# Patient Record
Sex: MALE | Race: WHITE | Employment: OTHER | URBAN - NONMETROPOLITAN AREA
[De-identification: names, ages, dates, MRNs, and addresses within clinical notes are randomized per-mention and may not be internally consistent; named-entity substitution may affect disease eponyms.]

---

## 2022-09-06 ENCOUNTER — INITIAL CONSULT (OUTPATIENT)
Dept: NEUROLOGY | Age: 72
End: 2022-09-06
Payer: MEDICARE

## 2022-09-06 VITALS
OXYGEN SATURATION: 98 % | WEIGHT: 191 LBS | HEART RATE: 68 BPM | DIASTOLIC BLOOD PRESSURE: 88 MMHG | SYSTOLIC BLOOD PRESSURE: 128 MMHG

## 2022-09-06 DIAGNOSIS — R29.898 BILATERAL LEG WEAKNESS: Primary | ICD-10-CM

## 2022-09-06 DIAGNOSIS — M21.371 RIGHT FOOT DROP: ICD-10-CM

## 2022-09-06 DIAGNOSIS — R26.89 BALANCE PROBLEM: ICD-10-CM

## 2022-09-06 PROCEDURE — 99205 OFFICE O/P NEW HI 60 MIN: CPT | Performed by: PSYCHIATRY & NEUROLOGY

## 2022-09-06 PROCEDURE — 1123F ACP DISCUSS/DSCN MKR DOCD: CPT | Performed by: PSYCHIATRY & NEUROLOGY

## 2022-09-06 RX ORDER — ASPIRIN 81 MG/1
81 TABLET ORAL DAILY
COMMUNITY

## 2022-09-06 RX ORDER — TESTOSTERONE CYPIONATE 200 MG/ML
75 INJECTION INTRAMUSCULAR WEEKLY
COMMUNITY

## 2022-09-06 RX ORDER — DILTIAZEM HYDROCHLORIDE 120 MG/1
TABLET, FILM COATED ORAL
COMMUNITY

## 2022-09-06 RX ORDER — KRILL/OM-3/DHA/EPA/PHOSPHO/AST 500-110 MG
CAPSULE ORAL
COMMUNITY
Start: 2020-07-20

## 2022-09-06 RX ORDER — DILTIAZEM HYDROCHLORIDE 120 MG/1
120 CAPSULE, EXTENDED RELEASE ORAL DAILY
COMMUNITY

## 2022-09-06 RX ORDER — TADALAFIL 5 MG/1
TABLET ORAL
COMMUNITY

## 2022-09-06 RX ORDER — SYRINGE WITH NEEDLE, 1 ML 25GX5/8"
SYRINGE, EMPTY DISPOSABLE MISCELLANEOUS
COMMUNITY
Start: 2022-08-28

## 2022-09-06 RX ORDER — MONTELUKAST SODIUM 10 MG/1
10 TABLET ORAL NIGHTLY
COMMUNITY

## 2022-09-06 RX ORDER — FLUTICASONE PROPIONATE 50 MCG
2 SPRAY, SUSPENSION (ML) NASAL PRN
COMMUNITY

## 2022-09-06 RX ORDER — ATORVASTATIN CALCIUM 40 MG/1
40 TABLET, FILM COATED ORAL DAILY
COMMUNITY

## 2022-09-06 RX ORDER — LOSARTAN POTASSIUM 25 MG/1
TABLET ORAL
COMMUNITY
Start: 2022-07-29

## 2022-09-06 RX ORDER — PANTOPRAZOLE SODIUM 40 MG/1
40 TABLET, DELAYED RELEASE ORAL DAILY
COMMUNITY

## 2022-09-06 RX ORDER — FLUDROCORTISONE ACETATE 0.1 MG/1
TABLET ORAL
COMMUNITY

## 2022-09-06 RX ORDER — POTASSIUM CHLORIDE 750 MG/1
10 TABLET, FILM COATED, EXTENDED RELEASE ORAL 2 TIMES DAILY
COMMUNITY

## 2022-09-06 RX ORDER — TAMSULOSIN HYDROCHLORIDE 0.4 MG/1
CAPSULE ORAL
COMMUNITY
Start: 2022-07-26

## 2022-09-06 RX ORDER — RANOLAZINE 500 MG/1
TABLET, EXTENDED RELEASE ORAL
COMMUNITY
Start: 2022-07-01

## 2022-09-06 RX ORDER — AMOXICILLIN 250 MG
CAPSULE ORAL EVERY MORNING
COMMUNITY

## 2022-09-06 NOTE — PATIENT INSTRUCTIONS
MRI lumbar spine WO contrast   MRI thoracic spine W/WO contrast  Vitamin B12, folate  Vitamin B6 level  Call with any new symptoms or concerns. Follow up in 1 month.

## 2022-09-22 DIAGNOSIS — R29.898 BILATERAL LEG WEAKNESS: Primary | ICD-10-CM

## 2022-09-22 DIAGNOSIS — M21.371 RIGHT FOOT DROP: ICD-10-CM

## 2022-09-22 DIAGNOSIS — R26.89 BALANCE PROBLEM: ICD-10-CM

## 2022-09-22 RX ORDER — LORAZEPAM 0.5 MG/1
TABLET ORAL
Qty: 1 TABLET | Refills: 0 | Status: SHIPPED | OUTPATIENT
Start: 2022-09-22 | End: 2022-09-28

## 2022-09-22 NOTE — TELEPHONE ENCOUNTER
Patient requesting something to help him remain calm for MRI on 9/28/2022. Please advise. Thank you.

## 2022-09-22 NOTE — TELEPHONE ENCOUNTER
Patient can take Ativan 0.5 mg one hour prior to MRI. 0 refills. Please ensure he has a  for the MRI.   Jorden Hopkins, CNP

## 2022-09-26 NOTE — PROGRESS NOTES
Chief Complaint   Patient presents with    Consultation     NP parethesia, trigger finger, left ring finger         Sonya Salmon is a 67 y.o. male who presents today for evaluation of right hand numbness, left hand trigger finger, however he reports these have improved since his referral was made and he is wishing to discuss his bilateral leg weakness. He has had bilateral leg weakness following his first back surgery in 2014. He then had a second back surgery in 2019. He reports climbing stairs is a struggle and the longer he walks the weaker his legs become. He reports he has to lay down to sleep and rest everyday around noon and he feels better and his energy level and weakness improves following his nap and he can continue throughout the day. He denies any slurred speech, trouble swallowing, shortness of breath, or double vision. He reports his balance is worse. No recent falls. He can have occasional bowel incontinence. He denies chest pain. No shortness of breath. No vision changes. No dysphagia. No fever. No rash. No weight loss. History provided by patient. Past Medical History:   Diagnosis Date    Hypertension        There is no problem list on file for this patient. Allergies   Allergen Reactions    Morphine Anaphylaxis     When taken with lopressor - caused hypotension and decreased sensation    Metoprolol Other (See Comments)     Lopressor -   When taken with morphine- caused hypotension and decreased sensation       Current Outpatient Medications   Medication Sig Dispense Refill    aspirin 81 MG EC tablet Take 81 mg by mouth daily      atorvastatin (LIPITOR) 40 MG tablet Take 40 mg by mouth daily      vitamin D (CHOLECALCIFEROL) 125 MCG (5000 UT) CAPS capsule Take 5,000 Units by mouth daily      Cobalamin Combinations (B-12) 100-5000 MCG SUBL every morning      dilTIAZem (CARDIZEM) 120 MG tablet diltiazem 120 mg tablet   Take 1 tablet every day by oral route.       dilTIAZem (TIAZAC) 120 MG extended release capsule Take 120 mg by mouth daily      fludrocortisone (FLORINEF) 0.1 MG tablet fludrocortisone 0.1 mg tablet   Take 1 tablet every day by oral route. fluticasone (FLONASE) 50 MCG/ACT nasal spray 2 sprays by Nasal route as needed      Krill Oil (OMEGA-3) 500 MG CAPS Krill-Om-3-Dha-Epa-Phospho-Ast Active 1 CAP PO Daily July 20th, 2020 10:57am      losartan (COZAAR) 25 MG tablet TAKE 1 TABLET BY MOUTH EVERY DAY      montelukast (SINGULAIR) 10 MG tablet Take 10 mg by mouth nightly      pantoprazole (PROTONIX) 40 MG tablet Take 40 mg by mouth daily      potassium chloride (KLOR-CON) 10 MEQ extended release tablet Take 10 mEq by mouth 2 times daily      ranolazine (RANEXA) 500 MG extended release tablet TAKE 1 TABLET BY MOUTH TWICE A DAY      B-D 3CC LUER-SAUL SYR 62YV6-4/2 21G X 1-1/2\" 3 ML MISC TO BE USED WITH DEPO TESTOSTERONE INJECTIONS WEEKLY      tadalafil (CIALIS) 5 MG tablet Cialis 5 mg tablet   Take 1 tablet every day by oral route. tamsulosin (FLOMAX) 0.4 MG capsule tamsulosin 0.4 mg capsule   Take 1 capsule every day by oral route. testosterone cypionate (DEPOTESTOTERONE CYPIONATE) 200 MG/ML injection Inject 75 mg into the muscle once a week. traMADol-acetaminophen (ULTRACET) 37.5-325 MG per tablet tramadol 37.5 mg-acetaminophen 325 mg tablet   Take 2 tablets every 4 hours by oral route. No current facility-administered medications for this visit. Social History     Socioeconomic History    Marital status:      Spouse name: None    Number of children: None    Years of education: None    Highest education level: None   Tobacco Use    Smoking status: Former     Types: Cigarettes    Smokeless tobacco: Never   Vaping Use    Vaping Use: Never used   Substance and Sexual Activity    Alcohol use:  Yes     Alcohol/week: 1.0 standard drink     Types: 1 Glasses of wine per week     Comment: socially    Drug use: Not Currently    Sexual activity: Yes     Partners: Female       Family History   Problem Relation Age of Onset    Hypertension Mother     Kidney Disease Mother     No Known Problems Father          I reviewed the past medical history, allergies, medications, social history and family history. Review of Systems   All systems reviewed, and are all negative, except what is mentioned in HPI      Vitals:    09/06/22 1457   BP: 128/88   Site: Left Upper Arm   Position: Sitting   Cuff Size: Medium Adult   Pulse: 68   SpO2: 98%   Weight: 191 lb (86.6 kg)       Physical Examination:  General appearance - alert, well appearing, and in no distress, oriented to person, place, and time and over weight  Mental status- Level of Alertness: awake  Orientation: person, place, time  Memory: normal  Fund of Knowledge: normal  Attention/Concentration: normal  Language: normal. Mood is normal.   Neck - supple, no significant adenopathy, carotids upstroke normal bilaterally. There is no axillary lymphadenopathy. There is no carotid bruit. No neck lymphadenopathy. No thyroid enlargement   Neurological -   Cranial Mekdxr-TD-MLD:.   Cranial nerve II: Normal   Cranial nerve III: Pupils: equal, round, reactive to light  Cranial nerves III, IV, VI: Extraocular Movements: intact   Cranial nerve V: Facial sensation: intact   Cranial nerve VII:Facial strength: intact   Cranial nerve VIII: Hearing: intact   Cranial nerve IX: Palate Elevation intact bilaterally  Cranial nerve XI: Shoulder shrug intact bilaterally  Cranial nerve XII: Tongue midline   neck supple without rigidity, there is  limitation of range of motion of the neck, bilaterally  DTR's are decreased distal and symmetric  Babinski sign negative  Motor exam is 5/5 in the bilateral upper extremities and 4/5 in bilateral lower extremities worse on the right than the left. There is right foot drop noted. . Normal muscle tone.  There is  muscle atrophy of left thigh  Sensory is intact for light touch  Coordination: finger to nose, intact  Gait and station intact  Abnormal movement none, vibration normal, proprioception normal  Skin - warm, dry to touch, normal coloration, no rashes, no suspicious skin lesions  Superficial temporal artery pulses are normal.   There is no limitation of range of motion of the neck. There is no resting tremor, no pin rolling, no bradykinesia, no Hypohonia, normal blink rate. Musculoskeletal: Has no hand arthritis, no limitation of ROM in any of the four extremities. There is no leg edema. The Heart was regular in rate and rhythm. No heart murmur  Chest Clear, with  good effort. Abdomen soft, intact bowel sounds. We reviewed the patient records from referring provider and available information in the EHR       ASSESSMENT:      Diagnosis Orders   1. Bilateral leg weakness        2. Right foot drop           This is a 65-year-old male who was initially referred for right hand numbness, left hand trigger finger, however he reports these have improved since his referral was made and he is wishing to discuss his bilateral leg weakness. He has had bilateral leg weakness following his first back surgery in 2014. He then had a second back surgery in 2019. He reports climbing stairs is a struggle and the longer he walks the weaker his legs become. He reports he has to lay down to sleep and rest everyday around noon and he feels better and his energy level and weakness improves following his nap and he can continue throughout the day. He also reports saddle numbness, and endorses occasional bowel incontinence. On exam, he has bilateral leg weakness as well as right foot drop. The patient was counseled about his symptoms and work up recommended, he was also counseled about DDx, and need to screen for lower spine pathology contributing to his symptoms.  We will arrange for him to undergo an MRI of the lumbar spine without contrast to screen for lumbar spine stenosis as well as MRI thoracic spine with and

## 2022-09-28 ENCOUNTER — HOSPITAL ENCOUNTER (OUTPATIENT)
Dept: MRI IMAGING | Age: 72
Discharge: HOME | End: 2022-09-28
Payer: MEDICARE

## 2022-09-28 DIAGNOSIS — R29.898 BILATERAL LEG WEAKNESS: ICD-10-CM

## 2022-09-28 DIAGNOSIS — M21.371 RIGHT FOOT DROP: ICD-10-CM

## 2022-09-28 PROCEDURE — 72157 MRI CHEST SPINE W/O & W/DYE: CPT

## 2022-09-28 PROCEDURE — 72148 MRI LUMBAR SPINE W/O DYE: CPT

## 2022-09-28 PROCEDURE — A9579 GAD-BASE MR CONTRAST NOS,1ML: HCPCS | Performed by: NURSE PRACTITIONER

## 2022-09-28 PROCEDURE — 6360000004 HC RX CONTRAST MEDICATION: Performed by: NURSE PRACTITIONER

## 2022-09-28 RX ADMIN — GADOTERIDOL 15 ML: 279.3 INJECTION, SOLUTION INTRAVENOUS at 18:33

## 2022-10-09 LAB — ACHR BINDING AB: 0 NMOL/L (ref 0–0.4)

## 2022-10-11 LAB — ACHR BLOCKING ABS, SERUM: 0 % (ref 0–26)

## 2022-10-13 LAB — ACETYLCHOL MODUL AB: 0 %

## 2022-10-31 ENCOUNTER — OFFICE VISIT (OUTPATIENT)
Dept: NEUROLOGY | Age: 72
End: 2022-10-31
Payer: MEDICARE

## 2022-10-31 VITALS
SYSTOLIC BLOOD PRESSURE: 130 MMHG | HEART RATE: 67 BPM | WEIGHT: 193 LBS | HEIGHT: 70 IN | OXYGEN SATURATION: 95 % | DIASTOLIC BLOOD PRESSURE: 78 MMHG | BODY MASS INDEX: 27.63 KG/M2

## 2022-10-31 DIAGNOSIS — R29.898 BILATERAL LEG WEAKNESS: Primary | ICD-10-CM

## 2022-10-31 DIAGNOSIS — R26.89 BALANCE PROBLEM: ICD-10-CM

## 2022-10-31 DIAGNOSIS — M21.371 RIGHT FOOT DROP: ICD-10-CM

## 2022-10-31 PROCEDURE — 99213 OFFICE O/P EST LOW 20 MIN: CPT | Performed by: NURSE PRACTITIONER

## 2022-10-31 PROCEDURE — 1123F ACP DISCUSS/DSCN MKR DOCD: CPT | Performed by: NURSE PRACTITIONER

## 2022-10-31 RX ORDER — ACETAMINOPHEN 325 MG/1
650 TABLET ORAL EVERY 4 HOURS PRN
COMMUNITY
Start: 2019-10-14

## 2022-10-31 NOTE — PATIENT INSTRUCTIONS
Referral to neurology at [facility] re: symptoms  Follow up as needed. Call if any questions or concerns.

## 2022-10-31 NOTE — PROGRESS NOTES
NEUROLOGY OUT PATIENT FOLLOW UP NOTE:  10/31/36159:19 AM    Jessika Distance is here for follow up for  bialteral leg weakness, right foot drop, balance trouble. ROS:  Respiratory : no cough, no shortness of breath  Cardiac: no chest pain. No palpitations.   Renal : no flank pain, no hematuria    Skin: no rash      Allergies   Allergen Reactions    Morphine Anaphylaxis     When taken with lopressor - caused hypotension and decreased sensation    Metoprolol Other (See Comments)     Lopressor -   When taken with morphine- caused hypotension and decreased sensation       Current Outpatient Medications:     acetaminophen (TYLENOL) 325 MG tablet, Take 650 mg by mouth every 4 hours as needed, Disp: , Rfl:     aspirin 81 MG EC tablet, Take 81 mg by mouth daily, Disp: , Rfl:     atorvastatin (LIPITOR) 40 MG tablet, Take 40 mg by mouth daily, Disp: , Rfl:     vitamin D (CHOLECALCIFEROL) 125 MCG (5000 UT) CAPS capsule, Take 5,000 Units by mouth daily, Disp: , Rfl:     Cobalamin Combinations (B-12) 100-5000 MCG SUBL, every morning, Disp: , Rfl:     dilTIAZem (CARDIZEM) 120 MG tablet, diltiazem 120 mg tablet  Take 1 tablet every day by oral route., Disp: , Rfl:     dilTIAZem (TIAZAC) 120 MG extended release capsule, Take 120 mg by mouth daily, Disp: , Rfl:     fludrocortisone (FLORINEF) 0.1 MG tablet, fludrocortisone 0.1 mg tablet  Take 1 tablet every day by oral route., Disp: , Rfl:     fluticasone (FLONASE) 50 MCG/ACT nasal spray, 2 sprays by Nasal route as needed, Disp: , Rfl:     Krill Oil (OMEGA-3) 500 MG CAPS, Krill-Om-3-Dha-Epa-Phospho-Ast Active 1 CAP PO Daily July 20th, 2020 10:57am, Disp: , Rfl:     losartan (COZAAR) 25 MG tablet, TAKE 1 TABLET BY MOUTH EVERY DAY, Disp: , Rfl:     montelukast (SINGULAIR) 10 MG tablet, Take 10 mg by mouth nightly, Disp: , Rfl:     pantoprazole (PROTONIX) 40 MG tablet, Take 40 mg by mouth daily, Disp: , Rfl:     potassium chloride (KLOR-CON) 10 MEQ extended release tablet, Take 10 mEq by mouth 2 times daily, Disp: , Rfl:     ranolazine (RANEXA) 500 MG extended release tablet, TAKE 1 TABLET BY MOUTH TWICE A DAY, Disp: , Rfl:     B-D 3CC LUER-SAUL SYR 25UO4-7/2 21G X 1-1/2\" 3 ML MISC, TO BE USED WITH DEPO TESTOSTERONE INJECTIONS WEEKLY, Disp: , Rfl:     tadalafil (CIALIS) 5 MG tablet, Cialis 5 mg tablet  Take 1 tablet every day by oral route., Disp: , Rfl:     tamsulosin (FLOMAX) 0.4 MG capsule, tamsulosin 0.4 mg capsule  Take 1 capsule every day by oral route., Disp: , Rfl:     testosterone cypionate (DEPOTESTOTERONE CYPIONATE) 200 MG/ML injection, Inject 75 mg into the muscle once a week., Disp: , Rfl:     traMADol-acetaminophen (ULTRACET) 37.5-325 MG per tablet, tramadol 37.5 mg-acetaminophen 325 mg tablet  Take 2 tablets every 4 hours by oral route., Disp: , Rfl:     I reviewed the past medical history, allergies, medications, social history and family history. PE:   Vitals:    10/31/22 0802   BP: 130/78   Site: Left Upper Arm   Position: Sitting   Cuff Size: Medium Adult   Pulse: 67   SpO2: 95%   Weight: 193 lb (87.5 kg)   Height: 5' 10\" (1.778 m)     General Appearance:  awake, alert, oriented, in no acute distress  Gen: NAD, Language is Intact. Skin: no rash, lesion, dry  to touch. warm  Head: no rash, no icterus  Neck: There is no carotid bruits. The Neck is supple. There is no neck lymphadenopathy. Neuro: CN 2-12 grossly intact with no focal deficits. Power 4/5 in bilateral lower extremities worse on the right than the left. There is right foot drop noted. Reflexes are  symmetric. Long tracts are intact. Cerebellar exam is Intact. Sensory exam is intact to light touch. Gait is intact. Musculoskeletal:  Has no hand arthritis, no limitation of ROM in any of the four extremities.   Lower extremities no edema          DATA:        Results for orders placed or performed in visit on 10/07/22   Acetylcholine Receptor, Binding   Result Value Ref Range    AChR Binding Ab 0.0 0.0 - 0.4 nmol/L   Acetylcholine Receptor, Blocking   Result Value Ref Range    AChR Blocking Abs, Serum 0 0 - 26 %   Acetylcholine Receptor, Modulating   Result Value Ref Range    Acetylchol Modul Ab 0 <=45 %               Assessment:     Diagnosis Orders   1. Bilateral leg weakness        2. Right foot drop        3. Balance problem             Follow up for right foot drop, balance trouble, bilateral leg weakness. His symptoms are the same, he denies any new symptoms. No falls. He denies any neck pain. He is still struggling to climb stairs especially in the evenings. He underwent lumbar spine and thoracic spine MRI that showed Multilevel degenerative changes without significant spinal canal or neuroforaminal stenosis at any thoracic or lumbar level. He also had lab work done checking vitamin levels and neuromuscular junction studies that showed no concerning findings. He has had extensive lumbar spine surgery in 2014 at [facility]. He is agreeable to pursue second opinion at [facility] regarding his symptoms. After detailed discussion with patient we agreed on the following plan. Plan:  Referral to neurology at [facility] re: symptoms  Follow up as needed. Call if any questions or concerns.     Total time 24 min    SAMI Ortega - CNP

## 2025-01-14 ENCOUNTER — LAB (OUTPATIENT)
Age: 75
End: 2025-01-14

## 2025-01-14 LAB
DEPRECATED MEAN GLUCOSE BLD GHB EST-ACNC: 108 MG/DL (ref 70–126)
GLUCOSE FASTING: 91 MG/DL (ref 70–108)
HBA1C MFR BLD HPLC: 5.6 % (ref 4.4–6.4)

## 2025-08-04 ENCOUNTER — LAB (OUTPATIENT)
Age: 75
End: 2025-08-04

## 2025-08-04 LAB
25(OH)D3 SERPL-MCNC: 33 NG/ML (ref 30–100)
ALBUMIN SERPL BCG-MCNC: 4.1 G/DL (ref 3.4–4.9)
ALP SERPL-CCNC: 76 U/L (ref 40–129)
ALT SERPL W/O P-5'-P-CCNC: 19 U/L (ref 10–50)
ANION GAP SERPL CALC-SCNC: 12 MEQ/L (ref 8–16)
AST SERPL-CCNC: 26 U/L (ref 10–50)
BASOPHILS ABSOLUTE: 0 THOU/MM3 (ref 0–0.1)
BASOPHILS NFR BLD AUTO: 0.7 %
BILIRUB SERPL-MCNC: 0.6 MG/DL (ref 0.3–1.2)
BUN SERPL-MCNC: 18 MG/DL (ref 8–23)
CALCIUM SERPL-MCNC: 9.3 MG/DL (ref 8.8–10.2)
CHLORIDE SERPL-SCNC: 105 MEQ/L (ref 98–111)
CHOLEST SERPL-MCNC: 143 MG/DL (ref 100–199)
CO2 SERPL-SCNC: 23 MEQ/L (ref 22–29)
CREAT SERPL-MCNC: 1.4 MG/DL (ref 0.7–1.2)
DEPRECATED RDW RBC AUTO: 47.1 FL (ref 35–45)
EOSINOPHIL NFR BLD AUTO: 3.6 %
EOSINOPHILS ABSOLUTE: 0.2 THOU/MM3 (ref 0–0.4)
ERYTHROCYTE [DISTWIDTH] IN BLOOD BY AUTOMATED COUNT: 13.2 % (ref 11.5–14.5)
FERRITIN SERPL IA-MCNC: 89 NG/ML (ref 30–400)
GFR SERPL CREATININE-BSD FRML MDRD: 52 ML/MIN/1.73M2
GLUCOSE SERPL-MCNC: 97 MG/DL (ref 74–109)
HCT VFR BLD AUTO: 42.7 % (ref 42–52)
HDLC SERPL-MCNC: 37 MG/DL
HGB BLD-MCNC: 14.5 GM/DL (ref 14–18)
IMM GRANULOCYTES # BLD AUTO: 0.02 THOU/MM3 (ref 0–0.07)
IMM GRANULOCYTES NFR BLD AUTO: 0.4 %
LDLC SERPL CALC-MCNC: 86 MG/DL
LYMPHOCYTES ABSOLUTE: 1.5 THOU/MM3 (ref 1–4.8)
LYMPHOCYTES NFR BLD AUTO: 26.4 %
MAGNESIUM SERPL-MCNC: 2.1 MG/DL (ref 1.6–2.6)
MCH RBC QN AUTO: 33 PG (ref 26–33)
MCHC RBC AUTO-ENTMCNC: 34 GM/DL (ref 32.2–35.5)
MCV RBC AUTO: 97 FL (ref 80–94)
MONOCYTES ABSOLUTE: 0.7 THOU/MM3 (ref 0.4–1.3)
MONOCYTES NFR BLD AUTO: 11.8 %
NEUTROPHILS ABSOLUTE: 3.2 THOU/MM3 (ref 1.8–7.7)
NEUTROPHILS NFR BLD AUTO: 57.1 %
NRBC BLD AUTO-RTO: 0 /100 WBC
PLATELET # BLD AUTO: 222 THOU/MM3 (ref 130–400)
PMV BLD AUTO: 12.4 FL (ref 9.4–12.4)
POTASSIUM SERPL-SCNC: 4 MEQ/L (ref 3.5–5.2)
PROT SERPL-MCNC: 6.8 G/DL (ref 6.4–8.3)
PSA SERPL-MCNC: 0.41 NG/ML (ref 0–1)
RBC # BLD AUTO: 4.4 MILL/MM3 (ref 4.7–6.1)
SHBG SERPL-SCNC: 84 NMOL/L (ref 19–76)
SODIUM SERPL-SCNC: 140 MEQ/L (ref 135–145)
TESTOST FREE MFR SERPL: 70.4 PG/ML (ref 47–244)
TESTOST SERPL-MCNC: 640 NG/DL (ref 193–740)
TRIGL SERPL-MCNC: 100 MG/DL (ref 0–199)
VIT B12 SERPL-MCNC: 343 PG/ML (ref 232–1245)
WBC # BLD AUTO: 5.6 THOU/MM3 (ref 4.8–10.8)

## 2025-08-06 LAB — CA-I SERPL ISE-SCNC: NORMAL MMOL/L
